# Patient Record
Sex: MALE | Race: WHITE | NOT HISPANIC OR LATINO | Employment: OTHER | URBAN - METROPOLITAN AREA
[De-identification: names, ages, dates, MRNs, and addresses within clinical notes are randomized per-mention and may not be internally consistent; named-entity substitution may affect disease eponyms.]

---

## 2020-11-08 ENCOUNTER — HOSPITAL ENCOUNTER (EMERGENCY)
Facility: HOSPITAL | Age: 45
Discharge: HOME | End: 2020-11-08
Attending: EMERGENCY MEDICINE

## 2020-11-08 VITALS
BODY MASS INDEX: 34.8 KG/M2 | RESPIRATION RATE: 18 BRPM | OXYGEN SATURATION: 100 % | TEMPERATURE: 99 F | HEART RATE: 80 BPM | WEIGHT: 235 LBS | DIASTOLIC BLOOD PRESSURE: 75 MMHG | SYSTOLIC BLOOD PRESSURE: 134 MMHG | HEIGHT: 69 IN

## 2020-11-08 DIAGNOSIS — L02.91 ABSCESS: Primary | ICD-10-CM

## 2020-11-08 PROCEDURE — 87147 CULTURE TYPE IMMUNOLOGIC: CPT

## 2020-11-08 PROCEDURE — 87186 SC STD MICRODIL/AGAR DIL: CPT

## 2020-11-08 PROCEDURE — 10060 I&D ABSCESS SIMPLE/SINGLE: CPT | Mod: LT

## 2020-11-08 PROCEDURE — 99283 EMERGENCY DEPT VISIT LOW MDM: CPT | Mod: 25

## 2020-11-08 PROCEDURE — 25000003 PHARM REV CODE 250: Performed by: EMERGENCY MEDICINE

## 2020-11-08 PROCEDURE — 87077 CULTURE AEROBIC IDENTIFY: CPT

## 2020-11-08 PROCEDURE — 87070 CULTURE OTHR SPECIMN AEROBIC: CPT

## 2020-11-08 RX ORDER — MUPIROCIN 20 MG/G
1 OINTMENT TOPICAL
Status: COMPLETED | OUTPATIENT
Start: 2020-11-08 | End: 2020-11-08

## 2020-11-08 RX ORDER — LIDOCAINE HYDROCHLORIDE 10 MG/ML
10 INJECTION, SOLUTION EPIDURAL; INFILTRATION; INTRACAUDAL; PERINEURAL
Status: COMPLETED | OUTPATIENT
Start: 2020-11-08 | End: 2020-11-08

## 2020-11-08 RX ORDER — MUPIROCIN 20 MG/G
OINTMENT TOPICAL 2 TIMES DAILY
Qty: 1 TUBE | Refills: 0 | Status: SHIPPED | OUTPATIENT
Start: 2020-11-08 | End: 2020-11-18

## 2020-11-08 RX ORDER — SULFAMETHOXAZOLE AND TRIMETHOPRIM 800; 160 MG/1; MG/1
1 TABLET ORAL 2 TIMES DAILY
Qty: 20 TABLET | Refills: 0 | Status: SHIPPED | OUTPATIENT
Start: 2020-11-08 | End: 2020-11-18

## 2020-11-08 RX ADMIN — MUPIROCIN 22 G: 20 OINTMENT TOPICAL at 09:11

## 2020-11-08 RX ADMIN — LIDOCAINE HYDROCHLORIDE 100 MG: 10 INJECTION, SOLUTION EPIDURAL; INFILTRATION; INTRACAUDAL; PERINEURAL at 08:11

## 2020-11-09 NOTE — ED PROVIDER NOTES
Encounter Date: 11/8/2020       History     Chief Complaint   Patient presents with    Abscess     Reports onset 5 days with LUE posterior 0 unk origin     44-year-old male presents to the emergency department with complaint of abscess to his upper posterior left humerus.  Patient denies associated fevers denies IV drug abuse.        Review of patient's allergies indicates:  No Known Allergies  No past medical history on file.  No past surgical history on file.  No family history on file.  Social History     Tobacco Use    Smoking status: Not on file   Substance Use Topics    Alcohol use: Not on file    Drug use: Not on file     Review of Systems   Constitutional: Negative for fever.   HENT: Negative.    Respiratory: Negative.    Cardiovascular: Negative.    Gastrointestinal: Negative.    Musculoskeletal: Negative.    Skin: Positive for wound.   Neurological: Negative.    Hematological: Negative.    Psychiatric/Behavioral: Negative.    All other systems reviewed and are negative.      Physical Exam     Initial Vitals [11/08/20 1945]   BP Pulse Resp Temp SpO2   (!) 152/100 80 18 98.5 °F (36.9 °C) 98 %      MAP       --         Physical Exam    Nursing note and vitals reviewed.  Constitutional: He appears well-developed and well-nourished.   HENT:   Head: Normocephalic and atraumatic.   Eyes: EOM are normal. Pupils are equal, round, and reactive to light.   Neck: Normal range of motion. Neck supple.   Cardiovascular: Normal rate, regular rhythm, normal heart sounds and intact distal pulses.   Pulmonary/Chest: Breath sounds normal.   Abdominal: Soft.   Neurological: He is alert and oriented to person, place, and time. He has normal strength. GCS score is 15. GCS eye subscore is 4. GCS verbal subscore is 5. GCS motor subscore is 6.   Skin: Abscess noted.         ED Course   I & D - Incision and Drainage    Date/Time: 11/8/2020 8:56 PM  Location procedure was performed: [facility] EMERGENCY DEPARTMENT  Performed by:  SUNSHINE Lea  Authorized by: Jakob Azevedo MD   Type: abscess  Body area: upper extremity  Location details: left arm  Anesthesia: local infiltration    Anesthesia:  Local Anesthetic: lidocaine 1% without epinephrine  Anesthetic total: 5 mL  Scalpel size: 11  Incision type: single straight  Complexity: simple  Drainage: pus  Drainage amount: moderate  Wound treatment: wound packed  Packing material: 1/4 in gauze        Labs Reviewed   CULTURE, AEROBIC  (SPECIFY SOURCE)          Imaging Results    None          Medical Decision Making:   Initial Assessment:   Abscess   ED Management:  44-year-old male presents to the emergency department with an abscess to the posterior aspect of the left upper arm patient denies shooting any type of drugs recently he states he has been clean for the last 2 years and denies any associated fevers he does has multiple track marks to both of his upper extremities.  Incision and drainage performed patient will be placed on Bactrim and given detailed return precautions including having wound checked with packing removal in 2 days.                             Clinical Impression:     ICD-10-CM ICD-9-CM   1. Abscess  L02.91 682.9                          ED Disposition Condition    Discharge Stable        ED Prescriptions     Medication Sig Dispense Start Date End Date Auth. Provider    sulfamethoxazole-trimethoprim 800-160mg (BACTRIM DS) 800-160 mg Tab Take 1 tablet by mouth 2 (two) times daily. for 10 days 20 tablet 11/8/2020 11/18/2020 SUNSHINE Lea    mupirocin (BACTROBAN) 2 % ointment Apply topically 2 (two) times daily. for 10 days 1 Tube 11/8/2020 11/18/2020 SUNSHINE Lea        Follow-up Information     Follow up With Specialties Details Why Contact [facility]  Schedule an appointment as soon as possible for a visit in 2 days For wound re-check and packing removal [place]  [id number]  [phone]                                         Vielka Nguyen, [facility]  11/08/20 [id number]

## 2020-11-11 LAB — BACTERIA SPEC AEROBE CULT: ABNORMAL
